# Patient Record
Sex: MALE
[De-identification: names, ages, dates, MRNs, and addresses within clinical notes are randomized per-mention and may not be internally consistent; named-entity substitution may affect disease eponyms.]

---

## 2020-01-03 PROBLEM — Z00.00 ENCOUNTER FOR PREVENTIVE HEALTH EXAMINATION: Status: ACTIVE | Noted: 2020-01-03

## 2020-01-13 ENCOUNTER — APPOINTMENT (OUTPATIENT)
Dept: COLORECTAL SURGERY | Facility: CLINIC | Age: 60
End: 2020-01-13

## 2021-02-25 ENCOUNTER — TRANSCRIPTION ENCOUNTER (OUTPATIENT)
Age: 61
End: 2021-02-25

## 2021-02-25 ENCOUNTER — APPOINTMENT (OUTPATIENT)
Dept: UROLOGY | Facility: CLINIC | Age: 61
End: 2021-02-25
Payer: COMMERCIAL

## 2021-02-25 VITALS
HEIGHT: 66 IN | OXYGEN SATURATION: 95 % | WEIGHT: 163 LBS | BODY MASS INDEX: 26.2 KG/M2 | DIASTOLIC BLOOD PRESSURE: 77 MMHG | TEMPERATURE: 98 F | SYSTOLIC BLOOD PRESSURE: 116 MMHG | HEART RATE: 69 BPM

## 2021-02-25 DIAGNOSIS — Z80.9 FAMILY HISTORY OF MALIGNANT NEOPLASM, UNSPECIFIED: ICD-10-CM

## 2021-02-25 DIAGNOSIS — Z12.5 ENCOUNTER FOR SCREENING FOR MALIGNANT NEOPLASM OF PROSTATE: ICD-10-CM

## 2021-02-25 DIAGNOSIS — Z63.5 DISRUPTION OF FAMILY BY SEPARATION AND DIVORCE: ICD-10-CM

## 2021-02-25 DIAGNOSIS — R31.9 HEMATURIA, UNSPECIFIED: ICD-10-CM

## 2021-02-25 PROCEDURE — 99204 OFFICE O/P NEW MOD 45 MIN: CPT

## 2021-02-25 PROCEDURE — 99072 ADDL SUPL MATRL&STAF TM PHE: CPT

## 2021-02-25 SDOH — SOCIAL STABILITY - SOCIAL INSECURITY: DISRUPTION OF FAMILY BY SEPARATION AND DIVORCE: Z63.5

## 2021-02-25 NOTE — ASSESSMENT
[FreeTextEntry1] : \par \par Impression/plan: 60 year-old male presents to the the office with symptomatic penile plaque and h/o hematuria. \par \par 1. Hematuria\par -Urine c+s, urine cytology\par -CT abd/pelvis urogram w/w/o contrast\par -F/u cystoscopy\par 2. Penile plaque\par -F/u with Dr. Avila\par 3. PSA drawn for prostate cancer screening. \par \par

## 2021-02-25 NOTE — PHYSICAL EXAM
[General Appearance - Well Developed] : well developed [General Appearance - Well Nourished] : well nourished [Normal Appearance] : normal appearance [Well Groomed] : well groomed [General Appearance - In No Acute Distress] : no acute distress [Edema] : no peripheral edema [Respiration, Rhythm And Depth] : normal respiratory rhythm and effort [Exaggerated Use Of Accessory Muscles For Inspiration] : no accessory muscle use [Normal Station and Gait] : the gait and station were normal for the patient's age [Oriented To Time, Place, And Person] : oriented to person, place, and time [Affect] : the affect was normal [Mood] : the mood was normal [Not Anxious] : not anxious [Urethral Meatus] : meatus normal [Penis Abnormality] : normal circumcised penis [Scrotum] : the scrotum was normal [Rectal Exam - Seminal Vesicles] : the seminal vesicles were normal [Epididymis] : the epididymides were normal [Testes Tenderness] : no tenderness of the testes [Testes Mass (___cm)] : there were no testicular masses [Anus Abnormality] : the anus and perineum were normal [Rectal Exam - Rectum] : digital rectal exam was normal [Prostate Enlargement] : the prostate was not enlarged [Prostate Tenderness] : the prostate was not tender [No Prostate Nodules] : no prostate nodules [Prostate Size ___ gm] : prostate size [unfilled] gm [Abdomen Soft] : soft [Abdomen Tenderness] : non-tender [] : no hepato-splenomegaly [Costovertebral Angle Tenderness] : no ~M costovertebral angle tenderness [FreeTextEntry1] : left-sided proximal penile plaque palpated (1 cm), DARLINE- smooth anodular prostate, no firmness. Plaque noted predominately on the left side of the penile shaft.

## 2021-02-25 NOTE — HISTORY OF PRESENT ILLNESS
[FreeTextEntry1] : 60 year-old male presents to the the office with complaints of noticing a bulge on the middle of the shaft of the penis that goes around. Pt states that it is tender to touch predominately when having an erection. Pt states that he experienced this during intercourse. Pt denies hearing a pop during intercourse and was able to able to maintain an erection. Denies bruising and bleeding.\par \par Pt states that he noticed some blood in the urine a year and half ago. States he went to a urologist (does not recall the name) and that the doctor took and sample of urine and saw blood. Pt states that the urologist wanted to perform further testing with a cystoscopy but pt declined. Pt has not noticed any hematuria since.\par \par \par Force of stream: weaker in the past 2-3 month\par Hesitancy: no\par intermittency:no\par Dribbling: no\par Daytime frequency: 3-4x\par Nighttime frequency: no\par Dysuria: no\par Urgency: no\par UUI: no\par FABY: no\par Pad usage:\par Straining to void: no\par Incomplete bladder emptying:\par UTI:no\par Hematuria: saw blood in the urine (1 &1/2) \par Stone disease: no\par STD: no\par Bowel Issue: constipation (whole life)\par Fluid intake: iced coffee a couple of times a week, 2 glasses of water or iced tea a day\par PVR - 0 ml. \par \par

## 2021-02-26 ENCOUNTER — NON-APPOINTMENT (OUTPATIENT)
Age: 61
End: 2021-02-26

## 2021-02-26 ENCOUNTER — APPOINTMENT (OUTPATIENT)
Dept: UROLOGY | Facility: CLINIC | Age: 61
End: 2021-02-26
Payer: COMMERCIAL

## 2021-02-26 VITALS
DIASTOLIC BLOOD PRESSURE: 73 MMHG | BODY MASS INDEX: 26.2 KG/M2 | SYSTOLIC BLOOD PRESSURE: 122 MMHG | WEIGHT: 163 LBS | TEMPERATURE: 97.6 F | HEART RATE: 71 BPM | HEIGHT: 66 IN

## 2021-02-26 DIAGNOSIS — N48.89 OTHER SPECIFIED DISORDERS OF PENIS: ICD-10-CM

## 2021-02-26 LAB — URINE CYTOLOGY: NORMAL

## 2021-02-26 PROCEDURE — 99072 ADDL SUPL MATRL&STAF TM PHE: CPT

## 2021-02-26 PROCEDURE — 99213 OFFICE O/P EST LOW 20 MIN: CPT

## 2021-02-26 NOTE — ASSESSMENT
[FreeTextEntry1] : likely acute peyrnoies\par no deformity at this time\par reviewed natural history with patient\par role of penile doppler /curvature assessment discussed\par will schedule

## 2021-02-26 NOTE — HISTORY OF PRESENT ILLNESS
[FreeTextEntry1] : 60M, hx of meningioma, s/p resections in 2012, here to establish care, reports 3 week history of post intercourse pain in the penis. Feels bulge in midshaft shaft, less discomfort down. No pain meds. No ecchymosis. Last intercourse last night felt sensitive in mid shaft. No curvature.\par \par No gross hematuria or hematospermia. No pain with intercourse.\par UA UCx and PSA pending\par \par PMHx: gross hematuria, being evaluated by Dr. Pepper\par FMHx: No Uro Ca\par \par Also last 4 months diminished stream

## 2021-03-01 ENCOUNTER — NON-APPOINTMENT (OUTPATIENT)
Age: 61
End: 2021-03-01

## 2021-03-01 LAB
BACTERIA UR CULT: NORMAL
PSA FREE FLD-MCNC: 40 %
PSA FREE SERPL-MCNC: 0.25 NG/ML
PSA SERPL-MCNC: 0.62 NG/ML

## 2021-03-12 ENCOUNTER — APPOINTMENT (OUTPATIENT)
Dept: UROLOGY | Facility: CLINIC | Age: 61
End: 2021-03-12
Payer: COMMERCIAL

## 2021-03-12 VITALS — TEMPERATURE: 97.5 F | SYSTOLIC BLOOD PRESSURE: 128 MMHG | HEART RATE: 68 BPM | DIASTOLIC BLOOD PRESSURE: 80 MMHG

## 2021-03-12 PROCEDURE — 99213 OFFICE O/P EST LOW 20 MIN: CPT | Mod: 25

## 2021-03-12 PROCEDURE — 54235 NJX CORPORA CAVERNOSA RX AGT: CPT

## 2021-03-12 PROCEDURE — 99072 ADDL SUPL MATRL&STAF TM PHE: CPT

## 2021-03-12 PROCEDURE — 93980 PENILE VASCULAR STUDY: CPT

## 2021-03-12 PROCEDURE — J3490T: CUSTOM | Mod: NC

## 2021-03-12 NOTE — HISTORY OF PRESENT ILLNESS
[FreeTextEntry1] : see attached penile doppler from today\par no deformity\par penile pain is diminishing \par here to discuss

## 2021-03-12 NOTE — ASSESSMENT
[FreeTextEntry1] : likely peyronies but no deformity\par no clear role for "preemptive" treatment with intralesional verapamil\par recommend observation\par will f/u with Dr Pepper to complete hematuria workup\par f/u here PRN

## 2024-12-27 ENCOUNTER — EMERGENCY (EMERGENCY)
Facility: HOSPITAL | Age: 64
LOS: 1 days | Discharge: HOME | End: 2024-12-27
Attending: STUDENT IN AN ORGANIZED HEALTH CARE EDUCATION/TRAINING PROGRAM | Admitting: STUDENT IN AN ORGANIZED HEALTH CARE EDUCATION/TRAINING PROGRAM
Payer: COMMERCIAL

## 2024-12-27 VITALS
SYSTOLIC BLOOD PRESSURE: 142 MMHG | WEIGHT: 162.92 LBS | OXYGEN SATURATION: 97 % | RESPIRATION RATE: 16 BRPM | HEIGHT: 66 IN | TEMPERATURE: 98 F | HEART RATE: 87 BPM | DIASTOLIC BLOOD PRESSURE: 84 MMHG

## 2024-12-27 PROCEDURE — 73560 X-RAY EXAM OF KNEE 1 OR 2: CPT | Mod: 26,LT

## 2024-12-27 PROCEDURE — 99284 EMERGENCY DEPT VISIT MOD MDM: CPT

## 2024-12-27 PROCEDURE — 73590 X-RAY EXAM OF LOWER LEG: CPT

## 2024-12-27 PROCEDURE — 99284 EMERGENCY DEPT VISIT MOD MDM: CPT | Mod: 25

## 2024-12-27 PROCEDURE — 73590 X-RAY EXAM OF LOWER LEG: CPT | Mod: 26,LT

## 2024-12-27 PROCEDURE — 73560 X-RAY EXAM OF KNEE 1 OR 2: CPT

## 2024-12-27 RX ORDER — IBUPROFEN 200 MG
600 TABLET ORAL ONCE
Refills: 0 | Status: COMPLETED | OUTPATIENT
Start: 2024-12-27 | End: 2024-12-27

## 2024-12-27 RX ADMIN — Medication 600 MILLIGRAM(S): at 15:37

## 2024-12-27 NOTE — ED PROVIDER NOTE - NSFOLLOWUPCLINICS_GEN_ALL_ED_FT
[facility]  Family Medicine  [address]  [place], [place] [address]  Phone: [phone]  Fax:

## 2024-12-27 NOTE — ED ADULT NURSE NOTE - OBJECTIVE STATEMENT
Pt presents to ED C/O L calf pain. States, " I was walking down the stairs and I heard a pop now it hurts to put pressure on my L leg". denies fall/injury

## 2024-12-27 NOTE — ED PROVIDER NOTE - PHYSICAL EXAMINATION
General: comfortable, resting in ED  HEENT: atraumatic, no eye erythema or discharge  Pulm: no cyanosis, no added work of breathing  Cardiac: no pallor, intact peripheral pulses including 2+ L DP  GI: no abdominal distension  Neuro: alert, conversant, intact sensation to medial and lateral L foot  Psych: neutral affect, cooperative  Msk: no gross deformity or instability of L leg or ankle, full active range of motion of L ankle and knee, intact left achilles tendon with 5/5 L ankle plantarflexion  Skin: no erythema / rash / induration / fluctuance / lesion over L posterior calf

## 2024-12-27 NOTE — ED PROVIDER NOTE - OBJECTIVE STATEMENT
64M with HLD and DM, now with sudden onset L posterior calf pain yesterday, felt a "pop," no associated fall or trauma, has been having pain while walking since.  No numbness or tingling of foot.  No fevers, chest pain, or trouble breathing.

## 2024-12-27 NOTE — ED PROVIDER NOTE - PATIENT PORTAL LINK FT
You can access the FollowMyHealth Patient Portal offered by [facility] by registering at the following website: [email or web address]. By joining [facility]’s FollowMyHealth portal, you will also be able to view your health information using other applications (apps) compatible with our system.

## 2024-12-27 NOTE — ED PROVIDER NOTE - CLINICAL SUMMARY MEDICAL DECISION MAKING FREE TEXT BOX
Concern for calf pain of unclear etiology, likely musculoskeletal given sudden onset with body motion.  R/o fracture or dislocation of lower leg given sudden onset pain.  No evidence for full tendon rupture, however partial tear possible given sudden onset and pain on plantarflexion, patient advised to seek specialty ortho care if pain does not resolve.  No evidence of distal neurovascular compromise of leg. Concern for calf pain of unclear etiology, likely musculoskeletal given sudden onset with body motion.  R/o fracture or dislocation of lower leg given sudden onset pain.  No evidence for full tendon rupture, however partial tear possible given sudden onset and pain on plantarflexion, patient advised to seek specialty ortho care if pain does not resolve.  No evidence of distal neurovascular compromise of leg.  Sudden onset focal posterior calf pain not consistent with DVT or baker's cyst infection, concern below threshold for ultrasound.

## 2024-12-27 NOTE — ED ADULT TRIAGE NOTE - CHIEF COMPLAINT QUOTE
Pt presents to ED C/O L calf pain. States, " I was walking down the stairs and I heard a pop now it hurts to put pressure on my L leg".

## 2024-12-30 DIAGNOSIS — E78.5 HYPERLIPIDEMIA, UNSPECIFIED: ICD-10-CM

## 2024-12-30 DIAGNOSIS — E11.9 TYPE 2 DIABETES MELLITUS WITHOUT COMPLICATIONS: ICD-10-CM

## 2024-12-30 DIAGNOSIS — M79.662 PAIN IN LEFT LOWER LEG: ICD-10-CM
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
